# Patient Record
Sex: FEMALE | Race: WHITE | NOT HISPANIC OR LATINO | URBAN - METROPOLITAN AREA
[De-identification: names, ages, dates, MRNs, and addresses within clinical notes are randomized per-mention and may not be internally consistent; named-entity substitution may affect disease eponyms.]

---

## 2017-03-19 ENCOUNTER — COMMUNICATION - HEALTHEAST (OUTPATIENT)
Dept: SCHEDULING | Facility: CLINIC | Age: 42
End: 2017-03-19

## 2017-05-16 ENCOUNTER — COMMUNICATION - HEALTHEAST (OUTPATIENT)
Dept: SCHEDULING | Facility: CLINIC | Age: 42
End: 2017-05-16

## 2018-02-14 ENCOUNTER — OFFICE VISIT - HEALTHEAST (OUTPATIENT)
Dept: FAMILY MEDICINE | Facility: CLINIC | Age: 43
End: 2018-02-14

## 2018-02-14 ENCOUNTER — COMMUNICATION - HEALTHEAST (OUTPATIENT)
Dept: TELEHEALTH | Facility: CLINIC | Age: 43
End: 2018-02-14

## 2018-02-14 DIAGNOSIS — F41.9 ANXIETY: ICD-10-CM

## 2018-02-14 RX ORDER — HYDROXYZINE PAMOATE 25 MG/1
25-100 CAPSULE ORAL 4 TIMES DAILY PRN
Qty: 100 CAPSULE | Refills: 0 | Status: SHIPPED | OUTPATIENT
Start: 2018-02-14

## 2018-02-14 ASSESSMENT — MIFFLIN-ST. JEOR: SCORE: 1326.74

## 2018-02-23 ENCOUNTER — COMMUNICATION - HEALTHEAST (OUTPATIENT)
Dept: FAMILY MEDICINE | Facility: CLINIC | Age: 43
End: 2018-02-23

## 2018-03-09 ENCOUNTER — COMMUNICATION - HEALTHEAST (OUTPATIENT)
Dept: FAMILY MEDICINE | Facility: CLINIC | Age: 43
End: 2018-03-09

## 2018-04-04 ENCOUNTER — COMMUNICATION - HEALTHEAST (OUTPATIENT)
Dept: FAMILY MEDICINE | Facility: CLINIC | Age: 43
End: 2018-04-04

## 2018-04-27 ENCOUNTER — COMMUNICATION - HEALTHEAST (OUTPATIENT)
Dept: FAMILY MEDICINE | Facility: CLINIC | Age: 43
End: 2018-04-27

## 2018-04-27 DIAGNOSIS — F41.9 ANXIETY: ICD-10-CM

## 2018-04-28 RX ORDER — ESCITALOPRAM OXALATE 10 MG/1
10 TABLET ORAL DAILY
Qty: 90 TABLET | Refills: 0 | Status: SHIPPED | OUTPATIENT
Start: 2018-04-28

## 2018-05-21 ENCOUNTER — OFFICE VISIT - HEALTHEAST (OUTPATIENT)
Dept: FAMILY MEDICINE | Facility: CLINIC | Age: 43
End: 2018-05-21

## 2018-05-21 DIAGNOSIS — J01.00 SUBACUTE MAXILLARY SINUSITIS: ICD-10-CM

## 2018-06-15 ENCOUNTER — COMMUNICATION - HEALTHEAST (OUTPATIENT)
Dept: FAMILY MEDICINE | Facility: CLINIC | Age: 43
End: 2018-06-15

## 2021-05-31 VITALS — HEIGHT: 63 IN | WEIGHT: 156 LBS | BODY MASS INDEX: 27.64 KG/M2

## 2021-06-01 VITALS — WEIGHT: 166 LBS | BODY MASS INDEX: 29.41 KG/M2

## 2021-06-16 PROBLEM — F41.9 ANXIETY: Status: ACTIVE | Noted: 2018-02-14

## 2021-06-16 NOTE — PROGRESS NOTES
ASSESSMENT AND PLAN:  Problem List Items Addressed This Visit     Anxiety - Primary     We discussed medications including SSRIs, Vistaril and benzodiazepines.  We also discussed counseling and lifestyle changes and self-care including yoga, meditation etc.    She opted to start with Lexapro 10 mg orally per day with as needed Vistaril.  In addition she would like to start counseling and eventually she wants to do some exercise and she is going to think more about how she can take better care of herself.    Follow up in 1-2 months.            Relevant Medications    escitalopram oxalate (LEXAPRO) 10 MG tablet    hydrOXYzine pamoate (VISTARIL) 25 MG capsule    Other Relevant Orders    Ambulatory referral to Psychology           Chief Complaint   Patient presents with     Anxiety     Patient has been dealing with this for the last couple of years.      Establish Care     HPI  rFeya Padilla is a 42 y.o. 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  She is really the core of her whole office.  Her job is high stress but she loves it.  She feels like since she took on this job her stress level has risen.  It is almost like she has had too much coffee and things are just spinning around her and her heart will start beating faster and her chest will become tight and she will have tingling around her mouth and in her fingertips at times.  She knows this is not normal and she believes that she has anxiety.  She has tried sitting in a dark room and breathing but this has not helped.  At home things are very busy as well.  She has 2 grown children age 22 and 18.  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    History   Smoking Status     Never Smoker   Smokeless Tobacco  "    Never Used      Current Outpatient Prescriptions   Medication Sig Dispense Refill     escitalopram oxalate (LEXAPRO) 10 MG tablet Take 1 tablet (10 mg total) by mouth daily. 30 tablet 1     hydrOXYzine pamoate (VISTARIL) 25 MG capsule Take 1-4 capsules ( mg total) by mouth 4 (four) times a day as needed for itching. 100 capsule 0     No current facility-administered medications for this visit.      Social History   Substance Use Topics     Smoking status: Never Smoker     Smokeless tobacco: Never Used     Alcohol use 1.2 oz/week     2 Cans of beer per week      Comment: occasional         No Known Allergies  Review of Systems   Constitutional: Negative.    HENT: Negative.    Eyes: Negative.    Respiratory: Negative.    Cardiovascular: Negative.    Gastrointestinal: Negative.    Endocrine: Negative.    Genitourinary: Negative.    Musculoskeletal: Negative.    Skin: Negative.    Neurological: Negative.    Hematological: Negative.    Psychiatric/Behavioral: The patient is nervous/anxious.      OBJECTIVE: /88  Pulse 68  Resp 14  Ht 5' 3\" (1.6 m)  Wt 156 lb (70.8 kg)  LMP 02/04/2018 (Exact Date)  Breastfeeding? No  BMI 27.63 kg/m2  Physical Exam   Constitutional: She is oriented to person, place, and time. She appears well-developed and well-nourished. No distress.   HENT:   Head: Normocephalic and atraumatic.   Eyes: Conjunctivae are normal.   Neck: Neck supple.   Cardiovascular: Normal rate and regular rhythm.    Pulmonary/Chest: Effort normal.   Musculoskeletal: Normal range of motion.   Neurological: She is alert and oriented to person, place, and time.   Skin: Skin is warm and dry.   Psychiatric: She has a normal mood and affect.      Blood tsh, glucose and hgb discussed but declined for now.    "

## 2021-06-18 NOTE — PROGRESS NOTES
Assessment:     1. Subacute maxillary sinusitis  amoxicillin-clavulanate (AUGMENTIN) 875-125 mg per tablet          Plan:     -Differential diagnosis includes but not limited to upper respiratory infection, sinusitis bacterial versus viral.  With the presenting symptoms and the length of symptoms patient made great care for antibiotics treatment.  We will treat patient with Augmentin twice daily ×10 days.  Patient is aware to also continue with supportive care including over-the-counter Claritin decongestant for congestion.  Increase fluid intake.  May take ibuprofen or Tylenol for the headache.  Monitor for worsening symptoms.  Need to follow-up with PCP if symptoms does not resolve after treatment.  Patient verbalized understanding the plan of care.    Subjective:       43 y.o. female presents for evaluation of a possible sinus infection.  Patient reports that she has had symptoms for about 8 days, prior to that she upper respiratory infection.  She reports head pressure, teeth pain, maxillary sinus tenderness.  2 days ago she lost her voice, and she has been coughing.  She has not been taking any cough or cold medications, she has been trying salt water gargles, tea with lemon and halley which normally works for her but this time is not working.  She denies change in appetite, she denies nausea, vomiting, diarrhea, or shortness of breath.  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    The following portions of the patient's history were reviewed and updated as appropriate: allergies, current medications, past family history, past medical history, past social history, past surgical history and problem list.    Review of Systems  A 12 point comprehensive review of systems was negative except as noted.     Objective:      /80  Pulse 81  Temp 98.4  F (36.9  C) (Oral)   Resp 14  Wt 166 lb (75.3 kg)  LMP 04/11/2018  (Approximate)  SpO2 98%  Breastfeeding? No  BMI 29.41 kg/m2  General appearance: alert, appears stated age, cooperative and moderate distress  Head: Normocephalic, without obvious abnormality, atraumatic, sinuses tender to percussion  Eyes: conjunctivae/corneas clear. PERRL, EOM's intact. Fundi benign.  Ears: abnormal TM right ear - bulging and air-fluid level and abnormal TM left ear - bulging and air-fluid level  Nose: Nares normal. Septum midline. Mucosa normal. No drainage or sinus tenderness., green discharge, moderate congestion, sinus tenderness bilateral, moderate maxillary sinus tenderness bilateral  Throat: abnormal findings: moderate oropharyngeal erythema  Lungs: clear to auscultation bilaterally  Heart: regular rate and rhythm, S1, S2 normal, no murmur, click, rub or gallop  Lymph nodes: Cervical, supraclavicular, and axillary nodes normal.  Neurologic: Grossly normal     This note has been dictated using voice recognition software. Any grammatical or context distortions are unintentional and inherent to the software

## 2021-06-27 ENCOUNTER — HEALTH MAINTENANCE LETTER (OUTPATIENT)
Age: 46
End: 2021-06-27

## 2021-10-17 ENCOUNTER — HEALTH MAINTENANCE LETTER (OUTPATIENT)
Age: 46
End: 2021-10-17

## 2022-04-02 ENCOUNTER — HEALTH MAINTENANCE LETTER (OUTPATIENT)
Age: 47
End: 2022-04-02

## 2022-07-23 ENCOUNTER — HEALTH MAINTENANCE LETTER (OUTPATIENT)
Age: 47
End: 2022-07-23

## 2022-10-01 ENCOUNTER — HEALTH MAINTENANCE LETTER (OUTPATIENT)
Age: 47
End: 2022-10-01

## 2023-05-14 ENCOUNTER — HEALTH MAINTENANCE LETTER (OUTPATIENT)
Age: 48
End: 2023-05-14

## 2023-08-12 ENCOUNTER — HEALTH MAINTENANCE LETTER (OUTPATIENT)
Age: 48
End: 2023-08-12